# Patient Record
Sex: MALE | Race: BLACK OR AFRICAN AMERICAN
[De-identification: names, ages, dates, MRNs, and addresses within clinical notes are randomized per-mention and may not be internally consistent; named-entity substitution may affect disease eponyms.]

---

## 2022-01-31 ENCOUNTER — HOSPITAL ENCOUNTER (EMERGENCY)
Dept: HOSPITAL 76 - ED | Age: 32
LOS: 1 days | Discharge: HOME | End: 2022-02-01
Payer: COMMERCIAL

## 2022-01-31 DIAGNOSIS — G43.909: Primary | ICD-10-CM

## 2022-01-31 DIAGNOSIS — H53.149: ICD-10-CM

## 2022-01-31 DIAGNOSIS — R11.0: ICD-10-CM

## 2022-01-31 PROCEDURE — 96375 TX/PRO/DX INJ NEW DRUG ADDON: CPT

## 2022-01-31 PROCEDURE — 96374 THER/PROPH/DIAG INJ IV PUSH: CPT

## 2022-01-31 PROCEDURE — 99283 EMERGENCY DEPT VISIT LOW MDM: CPT

## 2022-01-31 RX ADMIN — KETOROLAC TROMETHAMINE STA MG: 15 INJECTION, SOLUTION INTRAMUSCULAR; INTRAVENOUS at 22:23

## 2022-01-31 RX ADMIN — SODIUM CHLORIDE STA MLS/HR: 9 INJECTION, SOLUTION INTRAVENOUS at 22:23

## 2022-01-31 RX ADMIN — METOCLOPRAMIDE STA MG: 5 INJECTION, SOLUTION INTRAMUSCULAR; INTRAVENOUS at 22:23

## 2022-01-31 RX ADMIN — ACETAMINOPHEN STA MG: 325 TABLET ORAL at 22:24

## 2022-01-31 NOTE — ED PHYSICIAN DOCUMENTATION
History of Present Illness





- Stated complaint


Stated Complaint: MIGRANE





- Chief complaint


Chief Complaint: Heent





- History obtained from


History obtained from: Patient





- Additonal information


Additional information: 





31yM with pmh migraines p/w R sided frontal headache radiating to the back 

gradual onset around noon today, constant, 8/10, unrelieved with imitrex and 

naproxen, worse with bright light and sound, a/w nausea. denies head injury, 

fever, confusion, dizziness, vision changes. 





Review of Systems


Ten Systems: 10 systems reviewed and negative


Constitutional: denies: Fever, Chills


GI: reports: Nausea.  denies: Vomiting


Neurologic: reports: Headache





PD PAST MEDICAL HISTORY





- Present Medications


Home Medications: 


                                Ambulatory Orders











 Medication  Instructions  Recorded  Confirmed


 


Erenumab-Aooe [Aimovig  01/31/22 





Autoinjector]   


 


Escitalopram [Lexapro]  01/31/22 


 


Naproxen  01/31/22 


 


Nortriptyline [Pamelor]  01/31/22 


 


Sumatriptan Succinate [Imitrex]  01/31/22 


 


Topiramate [Topamax]  01/31/22 














- Allergies


Allergies/Adverse Reactions: 


                                    Allergies











Allergy/AdvReac Type Severity Reaction Status Date / Time


 


No Known Drug Allergies Allergy   Verified 01/31/22 22:00














PD ED PE NORMAL





- Vitals


Vital signs reviewed: Yes





- General


General: Alert and oriented X 3, No acute distress, Well developed/nourished





- HEENT


HEENT: Atraumatic, PERRL, EOMI, Moist mucous membranes, Pharynx benign





- Neck


Neck: Supple, no meningeal sign





- Derm


Derm: Normal color, Warm and dry





- Extremities


Extremities: No deformity





- Neuro


Neuro: Alert and oriented X 3, CNs 2-12 intact, No motor deficit, No sensory 

deficit, Normal speech


Eye Opening: Spontaneous


Motor: Obeys Commands


Verbal: Oriented


GCS Score: 15





- Psych


Psych: Normal mood, Normal affect





Results





- Vitals


Vitals: 





                               Vital Signs - 24 hr











  01/31/22





  21:53


 


Temperature 36 C L


 


Heart Rate 81


 


Respiratory 20





Rate 


 


Blood Pressure 117/79


 


O2 Saturation 100








                                     Oxygen











O2 Source                      Room air

















PD MEDICAL DECISION MAKING





- ED course


ED course: 





31yM p/w migraine headache, c/w his usual migraines but worse than usual. 

symptom relief provided. patient will f/u with pmd. return precautions given. 





Departure





- Departure


Clinical Impression: 


 Headache, Nausea, Photophobia





Condition: Stable


Instructions:  ED Headache Migraine


Comments: 


You were seen in the emergency department for migraine headache and received a 

cocktail of medications for symptom relief. Please follow up with your doctor 

and return to the ED if you have new or worsening symptoms or other concerns.

## 2022-02-01 VITALS — DIASTOLIC BLOOD PRESSURE: 70 MMHG | SYSTOLIC BLOOD PRESSURE: 114 MMHG

## 2022-10-04 ENCOUNTER — HOSPITAL ENCOUNTER (EMERGENCY)
Dept: HOSPITAL 76 - ED | Age: 32
Discharge: HOME | End: 2022-10-04
Payer: COMMERCIAL

## 2022-10-04 VITALS — SYSTOLIC BLOOD PRESSURE: 130 MMHG | DIASTOLIC BLOOD PRESSURE: 78 MMHG

## 2022-10-04 DIAGNOSIS — G43.911: Primary | ICD-10-CM

## 2022-10-04 PROCEDURE — 96375 TX/PRO/DX INJ NEW DRUG ADDON: CPT

## 2022-10-04 PROCEDURE — 99285 EMERGENCY DEPT VISIT HI MDM: CPT

## 2022-10-04 PROCEDURE — 96374 THER/PROPH/DIAG INJ IV PUSH: CPT

## 2022-10-04 PROCEDURE — 99284 EMERGENCY DEPT VISIT MOD MDM: CPT

## 2022-10-04 NOTE — ED PHYSICIAN DOCUMENTATION
PD HPI HEADACHE





- Stated complaint


Stated Complaint: MIGRAINE





- Chief complaint


Chief Complaint: Neuro





- History obtained from


History obtained from: Patient





- History of Present Illness


Timing - onset: How many days ago (2)


Timing - onset during: Rest, Light activity


Timing - duration: Days (2)


Timing - details: Gradual onset, Still present, Waxing and waning (improved 

briefly with home migraine meds but then returned.)


Worst headache ever?: No: Worst headache ever?


Location: Left


Quality: Throbbing, Aching


Associated symptoms: Nausea.  No: Fever, Stiff neck, Vomiting, Weakness, 

Numbness


Improved by: Dark room.  No: Meds (usually improved with sumatryptan and zofran.

Tried those without resolution this time.)


Worsened by: Light, Noise


Contributing factors: No: Recent illness, Trauma


Similar symptoms before: Diagnosis (migraines about every month.)


Recently seen: Clinic (seen by Neurologist today and got Rx for different 

tryptan. referred to ER for the status migraine.)





Review of Systems


Constitutional: denies: Fever, Chills


Nose: denies: Rhinorrhea / runny nose, Congestion


Throat: denies: Sore throat


Respiratory: denies: Cough





PD PAST MEDICAL HISTORY





- Past Medical History


Neuro: Migraines





- Present Medications


Home Medications: 


                                Ambulatory Orders











 Medication  Instructions  Recorded  Confirmed


 


Erenumab-Aooe [Aimovig 70 mg SQ PRN PRN 01/31/22 10/04/22





Autoinjector]   


 


Escitalopram [Lexapro] 10 mg PO DAILY 01/31/22 10/04/22


 


Naproxen 500 mg PO BID PRN 01/31/22 10/04/22


 


Nortriptyline [Pamelor] 50 mg PO QPM 01/31/22 10/04/22


 


Sumatriptan Succinate [Imitrex] 100 mg PO BID 01/31/22 10/04/22


 


Topiramate [Topamax] 100 mg PO BID 01/31/22 10/04/22














- Allergies


Allergies/Adverse Reactions: 


                                    Allergies











Allergy/AdvReac Type Severity Reaction Status Date / Time


 


No Known Drug Allergies Allergy   Verified 01/31/22 22:00














- Social History


Does the pt smoke?: No


Smoking Status: Never smoker


Does the pt drink ETOH?: Yes





PD ED PE NORMAL





- Vitals


Vital signs reviewed: Yes





- General


General: Alert and oriented X 3, Well developed/nourished, Other (appears 

uncomfortable. Light sensitive. )





- HEENT


HEENT: Atraumatic, PERRL (light sensitive), EOMI





- Neck


Neck: Supple, no meningeal sign, No adenopathy





- Cardiac


Cardiac: RRR, No murmur





- Respiratory


Respiratory: Clear bilaterally





- Derm


Derm: Normal color, Warm and dry





- Neuro


Neuro: Alert and oriented X 3, CNs 2-12 intact, No motor deficit, No sensory 

deficit, Normal speech


Eye Opening: Spontaneous


Motor: Obeys Commands


Verbal: Oriented


GCS Score: 15





- Psych


Psych: Normal mood, Normal affect





Results





- Vitals


Vitals: 


                                     Oxygen











O2 Source                      Room air

















PD MEDICAL DECISION MAKING





- ED course


Complexity details: re-evaluated patient (headache mostly gone. Sleepy from 

meds. Has ride. ), considered differential (seems like migraine that has 

persisted despite home meds.), d/w patient





Departure





- Departure


Disposition: 01 Home, Self Care


Clinical Impression: 


 Status migrainosus





Condition: Stable


Record reviewed to determine appropriate education?: Yes


Instructions:  ED Headache Migraine


Comments: 


Home and rest today.  Continue usual medications at home.  Tylenol if needed for

 pains.  Return if needed for recurrent or sustained migraine.  We did give a 

dose of a steroid medication here to that we will try to reduce the chance of 

rebound over the next few days.


Forms:  Activity restrictions


Discharge Date/Time: 10/04/22 13:11